# Patient Record
Sex: FEMALE | Race: OTHER | Employment: UNEMPLOYED | URBAN - METROPOLITAN AREA
[De-identification: names, ages, dates, MRNs, and addresses within clinical notes are randomized per-mention and may not be internally consistent; named-entity substitution may affect disease eponyms.]

---

## 2023-02-01 ENCOUNTER — HOSPITAL ENCOUNTER (EMERGENCY)
Age: 28
Discharge: HOME | End: 2023-02-01
Attending: EMERGENCY MEDICINE

## 2023-02-01 VITALS
TEMPERATURE: 98.6 F | DIASTOLIC BLOOD PRESSURE: 73 MMHG | OXYGEN SATURATION: 98 % | HEART RATE: 77 BPM | RESPIRATION RATE: 18 BRPM | WEIGHT: 130 LBS | BODY MASS INDEX: 22.2 KG/M2 | SYSTOLIC BLOOD PRESSURE: 130 MMHG | HEIGHT: 64 IN

## 2023-02-01 DIAGNOSIS — N93.9 VAGINAL BLEEDING: Primary | ICD-10-CM

## 2023-02-01 DIAGNOSIS — N39.0 URINARY TRACT INFECTION WITHOUT HEMATURIA, SITE UNSPECIFIED: ICD-10-CM

## 2023-02-01 LAB
APPEARANCE UR: CLEAR
BACTERIA URNS QL MICRO: ABNORMAL /HPF
BILIRUB UR QL: NEGATIVE
COLOR UR: ABNORMAL
EPI CELLS #/AREA URNS HPF: ABNORMAL /HPF
GLUCOSE UR STRIP.AUTO-MCNC: NEGATIVE MG/DL
HCG UR QL: NEGATIVE
HGB UR QL STRIP: ABNORMAL
KETONES UR QL STRIP.AUTO: 15 MG/DL
LEUKOCYTE ESTERASE UR QL STRIP.AUTO: ABNORMAL
MUCOUS THREADS URNS QL MICRO: ABNORMAL /LPF
NITRITE UR QL STRIP.AUTO: NEGATIVE
PH UR STRIP: 5.5 (ref 5–9)
PROT UR STRIP-MCNC: ABNORMAL MG/DL
RBC #/AREA URNS HPF: ABNORMAL /HPF
SP GR UR REFRACTOMETRY: 1.03 (ref 1–1.02)
UROBILINOGEN UR QL STRIP.AUTO: 1 EU/DL (ref 0.2–1)
WBC URNS QL MICRO: ABNORMAL /HPF

## 2023-02-01 PROCEDURE — 99283 EMERGENCY DEPT VISIT LOW MDM: CPT

## 2023-02-01 PROCEDURE — 81001 URINALYSIS AUTO W/SCOPE: CPT

## 2023-02-01 PROCEDURE — 81025 URINE PREGNANCY TEST: CPT

## 2023-02-01 RX ORDER — CEPHALEXIN 500 MG/1
500 CAPSULE ORAL 3 TIMES DAILY
Qty: 15 CAPSULE | Refills: 0 | Status: SHIPPED | OUTPATIENT
Start: 2023-02-01 | End: 2023-02-06

## 2023-02-01 NOTE — ED PROVIDER NOTES
Emergency Department Provider Note                   PCP:                None None               Age: 24 y.o. Sex: female     No diagnosis found. DISPOSITION          Medical Decision Making  82-year-old  female history of Nexplanon implantation presents with vaginal bleeding which began yesterday. She states the bleeding is not heavy but when she goes to use the bathroom she notices some clots. She states that she has not had any menstrual period in 6 years since having her first implant placed. She had it replaced in 2020 and is due for replacement November of this year. She is sexually active. She does report some menstrual type cramping. No other complaints at this time. Exam  Well-nourished well-developed  female awake alert and comfortable in appearance. No respiratory or other distress. HEENT exam normocephalic atraumatic. Pupils equal and reactive normal conjunctive a. Oropharynx moist mucous membranes. Neck is supple cardiovascular regular rate and rhythm. Lungs are clear. Abdomen is soft nontender. Pelvic exam, trace cervical bleeding. ED course  Patient has normal vital signs with minimal bleeding. She does not require blood work. Urine pregnancy is negative. Urinalysis incidentally noted to have possible UTI with 20-50 white cells only 0-3 epithelial.  Will place on Keflex. She is advised to treat the bleeding as a menstrual period and follow-up with her OB/GYN. Amount and/or Complexity of Data Reviewed  Labs: ordered.                                 Orders Placed This Encounter   Procedures    Urinalysis    Pregnancy, Urine    PELVIC EXAM SET UP        Medications - No data to display    New Prescriptions    No medications on file        Mayelin Hayes is a 24 y.o. female who presents to the Emergency Department with chief complaint of    Chief Complaint   Patient presents with    Vaginal Bleeding      HPI      Review of Systems    No past medical history on file. No past surgical history on file. No family history on file. Social History     Socioeconomic History    Marital status: Single         Patient has no known allergies. Previous Medications    No medications on file        Vitals signs and nursing note reviewed. Patient Vitals for the past 4 hrs:   Temp Pulse Resp BP SpO2   02/01/23 1235 98.6 °F (37 °C) 77 -- 130/73 98 %   02/01/23 1234 -- -- 18 -- --          Physical Exam     Procedures    Results for orders placed or performed during the hospital encounter of 02/01/23   Pregnancy, Urine   Result Value Ref Range    HCG(Urine) Pregnancy Test Negative NEG          No orders to display                       Voice dictation software was used during the making of this note. This software is not perfect and grammatical and other typographical errors may be present. This note has not been completely proofread for errors.      Rhonda No MD  02/01/23 [id number]

## 2023-02-01 NOTE — ED NOTES
I have reviewed discharge instructions with the patient. The patient verbalized understanding. Patient left ED via Discharge Method: ambulatory to Home with (insert name of family/friend, self, transport self). Opportunity for questions and clarification provided. Patient given 1 scripts. To continue your aftercare when you leave the hospital, you may receive an automated call from our care team to check in on how you are doing. This is a free service and part of our promise to provide the best care and service to meet your aftercare needs. \" If you have questions, or wish to unsubscribe from this service please call [phone]. Thank you for Choosing our [facility] Emergency Department.         Virgil Cooper RN  02/01/23 1037

## 2023-02-01 NOTE — ED TRIAGE NOTES
#[id number]- started to bleed yesterday with clots. Patient does have the birth control device. it is due to be changed in November this year. Patient have not had a heavy period. Clots are present while in her cycle. Sexually active. Pt having been experiencing the pain and the bleeding.